# Patient Record
Sex: MALE | Race: OTHER | Employment: PART TIME | URBAN - METROPOLITAN AREA
[De-identification: names, ages, dates, MRNs, and addresses within clinical notes are randomized per-mention and may not be internally consistent; named-entity substitution may affect disease eponyms.]

---

## 2019-06-11 ENCOUNTER — OFFICE VISIT (OUTPATIENT)
Dept: FAMILY MEDICINE CLINIC | Age: 39
End: 2019-06-11

## 2019-06-11 VITALS — SYSTOLIC BLOOD PRESSURE: 124 MMHG | HEART RATE: 76 BPM | DIASTOLIC BLOOD PRESSURE: 70 MMHG

## 2019-06-11 DIAGNOSIS — M25.561 ACUTE PAIN OF RIGHT KNEE: Primary | ICD-10-CM

## 2019-06-11 PROCEDURE — 99202 OFFICE O/P NEW SF 15 MIN: CPT | Performed by: INTERNAL MEDICINE

## 2019-06-11 RX ORDER — IBUPROFEN 600 MG/1
600 TABLET ORAL 3 TIMES DAILY PRN
Qty: 90 TABLET | Refills: 0 | Status: SHIPPED | OUTPATIENT
Start: 2019-06-11 | End: 2019-07-11

## 2019-06-11 NOTE — PROGRESS NOTES
Internal Medicine Leckrone Olive Hardwick is a 42 y.o. male, presents to the mobile clinic with complaints of right knee pain which started during a soccer game 4 months ago. There was no swelling. The pain worsens with walking. It continues at the same severity. He has not had an xray. Review of Systems   Constitutional: No fever, chills, weight loss or gain, night sweats  Respiratory: No cough, dyspnea, wheezing, sputum, or hemoptysis  Cardiovascular: No chest pain, angina, dyspnea on exertion, orthopnea, PND Gastrointestinal: No nausea, vomiting, diarrhea, abdominal pain, or blood per rectum  Genitourinary: No dysuria, nocturia, hesitancy, or incontinence  Musculoskeletal: right knee pain  Objective:   Physical Exam   /70 (Site: Left Upper Arm, Position: Sitting, Cuff Size: Medium Adult)   Pulse 76   General appearance: Alert, Awake, Oriented to Person, Place, and Time   Head: Normocephalic. No masses, lesions or tenderness noted. Eyes: Conjunctivae appear normal, EOMI, PERRL. No scleral icterus or drainage. Ears: External ears normal, no otalgia or erythema. Nose/Sinuses: Nares normal. Septum midline. Mucosa normal. No drainage  Oropharynx: Oropharynx clear with no exudate seen  Neck: Neck supple. No jugular venous distension, lymphadenopathy or thyromegaly. Trachea midline  Lungs: Lungs clear to auscultation bilaterally. No rhonchi, crackles or wheezes  Heart:  Regular rate and rhythm with auscultation of S1, S2. No rub, murmur or gallop  Abdomen: Soft, non-tender abdomen with bowel sounds in four quadrants. No hepatosplenomegaly or masses. Extremities: No edema, Peripheral pulses palpable in bilateral radial arteries and posterior tibial.   Musculoskeletal: Muscular strength appears intact. No joint effusion, tenderness, swelling or warmth. Pain with ROM, both extension and flexion  Skin: Warm and dry, no acute eczematous changes or pruritus.    Neuro:  No focal motor or sensory deficits. CN II- XII intact. Assessment & Plan:   Naveen Washington was seen today for knee pain. Diagnoses and all orders for this visit:    Acute pain of right knee  -     XR KNEE RIGHT (1-2 VIEWS); Future    Other orders  -     ibuprofen (ADVIL;MOTRIN) 600 MG tablet;  Take 1 tablet by mouth 3 times daily as needed for Pain            Return to clinic in     Layla Ramesh M.D.,  6/11/2019

## 2019-06-15 ENCOUNTER — HOSPITAL ENCOUNTER (OUTPATIENT)
Dept: GENERAL RADIOLOGY | Age: 39
Discharge: HOME | End: 2019-06-17

## 2019-06-15 ENCOUNTER — HOSPITAL ENCOUNTER (OUTPATIENT)
Age: 39
Discharge: HOME | End: 2019-06-17

## 2019-06-15 DIAGNOSIS — M25.561 ACUTE PAIN OF RIGHT KNEE: ICD-10-CM

## 2019-06-15 PROCEDURE — 73560 X-RAY EXAM OF KNEE 1 OR 2: CPT

## 2019-06-20 ENCOUNTER — TELEPHONE (OUTPATIENT)
Dept: FAMILY MEDICINE CLINIC | Age: 39
End: 2019-06-20

## 2019-06-20 DIAGNOSIS — M25.561 ACUTE PAIN OF RIGHT KNEE: Primary | ICD-10-CM

## 2019-07-24 DIAGNOSIS — M25.561 ACUTE PAIN OF RIGHT KNEE: Primary | ICD-10-CM

## 2019-08-20 ENCOUNTER — OFFICE VISIT (OUTPATIENT)
Dept: ORTHOPEDIC SURGERY | Age: 39
End: 2019-08-20

## 2019-08-20 VITALS
HEART RATE: 64 BPM | SYSTOLIC BLOOD PRESSURE: 113 MMHG | BODY MASS INDEX: 25.83 KG/M2 | WEIGHT: 155 LBS | DIASTOLIC BLOOD PRESSURE: 81 MMHG | HEIGHT: 65 IN | RESPIRATION RATE: 16 BRPM

## 2019-08-20 DIAGNOSIS — S83.281A ACUTE LATERAL MENISCUS TEAR OF RIGHT KNEE, INITIAL ENCOUNTER: Primary | ICD-10-CM

## 2019-08-20 PROCEDURE — 99203 OFFICE O/P NEW LOW 30 MIN: CPT | Performed by: ORTHOPAEDIC SURGERY

## 2019-08-20 NOTE — PROGRESS NOTES
New Patient      Loretta Sanchez is a 42 y.o. male, hisdate of birth is 1980 with the following history as recorded in [facility]:    HPI: Patient is a pleasant 74-year-old  male who comes in today with a chief complaint of right knee pain. He does have a  with him today. He states that he was playing soccer in February when he planted his foot and felt a pop. Since that point time he had significant lateral sided joint pain. He denies any large effusion on the day of the injury. He is getting around with no assistance walking but does have a slight antalgic gait. Otherwise he states he denies any further injuries to the knee. He has had no treatment up until now. Patient Active Problem List    Diagnosis Date Noted    Acute lateral meniscus tear of right knee 08/20/2019     Current Outpatient Medications   Medication Sig Dispense Refill    ibuprofen (ADVIL;MOTRIN) 600 MG tablet Take 1 tablet by mouth 3 times daily as needed for Pain 90 tablet 0     No current facility-administered medications for this visit. Allergies: Patient has no known allergies. History reviewed. No pertinent past medical history. History reviewed. No pertinent surgical history. History reviewed. No pertinent family history.   Social History     Tobacco Use    Smoking status: Never Smoker    Smokeless tobacco: Never Used   Substance Use Topics    Alcohol use: Yes     Comment: once a month       Review of Systems     Review of Systems - General ROS: negative for - chills, fatigue, fever, malaise or night sweats  Ophthalmic ROS: negative for - blurry vision, decreased vision, double vision, dry eyes, excessive tearing, eye pain or loss of vision  ENT ROS: negative for - headaches, hearing change, nasal congestion, sinus pain, sore throat, tinnitus or vertigo  Allergy and Immunology ROS: negative for - itchy/watery eyes, nasal congestion, postnasal drip or seasonal allergies  Hematological

## 2019-08-20 NOTE — PATIENT INSTRUCTIONS
el piso y descanse roxie 10 segundos. [address] 12 repeticiones. Flexiones del tendón de la corva    1. Acuéstese boca abajo con las rodillas rectas. Si tiene incomodidad en la rótula, enrolle un paño y póngalo debajo de la pierna masood por encima de la [place]. 2. [place] el pie de almaguer pierna lesionada flexionando la rodilla de manera que lleve el pie hacia almaguer nalga (glúteo). Si yusuf movimiento duele, pruebe sin doblar tanto la rodilla. [place] puede ayudarle a evitar cualquier movimiento doloroso. 3. Baje lentamente la pierna hasta el piso. [address] 12 repeticiones. 5. Con el permiso de almaguer médico o fisioterapeuta, ben vez también [place] agregar anaid polaina con peso al tobillo (no más de 5 libras [2.25 kilos]). Con el peso, usted no tiene que levantar la pierna más de 12 pulgadas (30 cm) para ejercitar al tendón de la corva. Postura de pie superficial con flexiones de la rodilla    1. Párese con las yosvany ligeramente apoyadas sobre un mesón o en anaid silla frente a usted. Separe los pies a la distancia de los hombros.  2. Doble lentamente las piernas para que se acuclille darrell si se fuera a sentar en anaid silla. Asegúrese de que blanca rodillas no queden tawana de los dedos de los pies. 3. Agáchese unas 6 pulgadas (15 cm). Los talones deben permanecer en el suelo en todo momento. 4. Levántese lentamente hasta quedar enderezado. Levantamientos de talón    1. Párese con los pies separados unas pulgadas, con las yosvany reposando ligeramente en un mesón o en anaid silla frente a usted. 2. Lentamente levante los talones del piso mientras mantiene las rodillas rectas. 3. Mantenga la posición roxie unos 6 segundos, luego baje los talones lentamente [employer]. 4. [place] de 8 a [address]. La atención de seguimiento es anaid parte clave de almaguer tratamiento y seguridad. Asegúrese de hacer y acudir a todas las citas, y llame a almaguer médico si está teniendo problemas.

## 2019-09-07 ENCOUNTER — HOSPITAL ENCOUNTER (OUTPATIENT)
Dept: MRI IMAGING | Age: 39
Discharge: HOME | End: 2019-09-09

## 2019-09-07 DIAGNOSIS — S83.281A ACUTE LATERAL MENISCUS TEAR OF RIGHT KNEE, INITIAL ENCOUNTER: ICD-10-CM

## 2019-09-07 PROCEDURE — 73721 MRI JNT OF LWR EXTRE W/O DYE: CPT
